# Patient Record
Sex: FEMALE | Race: WHITE | URBAN - METROPOLITAN AREA
[De-identification: names, ages, dates, MRNs, and addresses within clinical notes are randomized per-mention and may not be internally consistent; named-entity substitution may affect disease eponyms.]

---

## 2020-02-11 ENCOUNTER — OFFICE VISIT (OUTPATIENT)
Dept: NEUROPSYCHOLOGY | Facility: CLINIC | Age: 65
End: 2020-02-11
Payer: COMMERCIAL

## 2020-02-11 DIAGNOSIS — R41.3 MEMORY LOSS: Primary | ICD-10-CM

## 2020-02-11 NOTE — LETTER
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  At her annual well physical examination on 10/10/2019, she reported experiencing memory changes.  She cannot come up with words as easily as she used to.  She is doing well at work as a researcher but feels that she should be more efficient at times.  She has a family history of dementia.  She was referred for neuropsychological evaluation by Keshia Dominguez MD, for further characterization of any cognitive difficulties, to establish a neurocognitive baseline, and to evaluate mood.    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  She would be giving an example during a lecture and would lose track of what she was saying.  She feels like she is \"dumbing down\" her language.  She also may see a movie and has to stop and think a few days later to remember it.  She does not know if her cognitive difficulties are progressive or if she is more aware of them. She notices that she forgets details of conversations.  She had two meetings out of state within 5 days, and when she came home she had to write down everything quickly because she worried that she would forget it.  She loses track of what she was in the middle of doing.  She is not as facile with summarizing large numbers of articles.  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  She feels that the quality of her work is good and she keeps getting more contracts.  She " described herself as a concrete sequential thinker, and she was frustrated with herself because she recently wrote an email to a  about something that she did not need to worry about.    Dr. Nice lives with her .  She manages their finances and is the power of  for her father, and does these tasks without difficulty.  She manages her own medications, driving, and cooking, also without difficulty.  She handles her personal cares independently.    Dr. Nice denied any history of psychiatric illness.  She has worked with a psychologist at various times in the past.  She noted that her mother committed suicide when Dr. Nice was 25.  She has never been hospitalized for psychiatric care.  When asked to describe her mood, she stated that it comes and goes.  Her father is dying and her mother  in July, and they have had to move him twice.  Her dog had to be put down, her  had knee surgery, and her daughter recently .  2019 was stressful for her.  She has down days but also has things to look forward to.  She is no more irritable than normal.  She cried a lot this weekend while watching her father.  She falls asleep easily and sleeps 7 hours at night.  She has not been napping during the day.  Her appetite has been lower and she has lost 60 pounds.  Her energy level, interest level, and motivation are good.  She walks her dog daily.  She denied suicidal ideation or any history of attempts to commit suicide.  She denied visual or auditory hallucinations.  She drinks a glass of wine every week or two.  She denied illicit drug use or tobacco use.  She has never been in any chemical dependency treatment programs.    Dr. Nice completed a Ph.D. in [employer] and [employer] research.  She worked at the [employer] until May 2017, and is an emeritus  in the [employer].  She taught classes until the 2017, and now works as a consultant.  She has  been  almost 28 years.  She has 2 children, and 2 stepchildren, as well as 2 grandchildren.    Dr. Nice denied any history of seizure, stroke, or head injury resulting in loss of consciousness.  Her balance has been good.  Yoga has been helpful for this.  She has had numbness in her left foot and noted that she has had 2 foot surgeries.  She has not noticed any tremor or headaches.  She has pain in her left hip while walking, which she believes is because she is accommodating her left foot.    PAST MEDICAL HISTORY: Medical records indicate a history of migraine, hyperlipidemia, hypertension.    CURRENT MEDICATIONS:  Include multiple vitamins-minerals, omega-3 fatty acids, magnesium chloride, calcium citrate, acetaminophen-isometheptene-dichloral, hydrochlorothiazide, estradiol, naproxen, turmeric, valacyclovir, ketoconazole.    FAMILY MEDICAL HISTORY:  Significant for a paternal grandmother who had dementia for the last 3 years of her life, who was also diagnosed around that time with Parkinson's disease, and who  at 98, a father who is 91 and has dementia with memory problems starting about 2 years ago.  Her father and grandmother have cerebrovascular disease.    BEHAVIORAL OBSERVATIONS    During the evaluation, Dr. Nice was pleasant, cooperative, and seemed to understand the instructions.  She was alert and oriented to person, place, and time.  No abnormal movements were observed clinically.  Mood was euthymic.  Speech was fluent, with normal articulation, volume, and rate.  Spontaneous conversation was present and appropriate.  Internal performance validity measures fell within normal limits.  The results are believed to accurately reflect her current level of functioning.    MEASURES ADMINISTERED    The following measures were administered by a trained psychometrist, under the direct supervision of a licensed psychologist:    Wechsler Abbreviated Scale of Intelligence - 2 (WASI-2); Subtests of the  Wechsler Adult Intelligence Scale - 4; Reading subtest of the Wide Range Achievement Test - 4; subtests of the Wechsler Memory Scale - Revised; Juanpablo Auditory Verbal Learning Test; Juanpablo Complex Figure; Jackson Naming Test; Controlled Oral Word Association Test; Semantic Fluency; Clock Drawing; Valdez Judgment of Line Orientation; Trail Making Test; Stroop; Wisconsin Card Sorting Test; Finger Tapping; Grooved Pegboard; Dementia Rating Scale - 2 (DRS-2); Bernardo Depression Inventory - 2 (BDI-2); Bernardo Anxiety Inventory (ELLEN).    RESULTS AND INTERPRETATION    Overall intellectual functioning was estimated to fall in the above average range, consistent with premorbid estimates based on single word reading abilities.  Performance on a screening measure of dementia was high average (DRS-2 Total Score = 142/144).    Confrontation naming was average for her age and level of education.  Expressive vocabulary was above average for her age.  Letter fluency was average for her age and level of education, and generative naming to category was above average.    Attention span was average for her age.  Divided attention was average.  Performance on a measure of distractibility was high average.  Psychomotor processing speed was average for her age.  Finger tapping speed was average bilaterally.  Fine manual dexterity was above average bilaterally.    Basic visual perception, including matching lines and angles, was average for her age and level of education.  Construction of a clock fell within normal limits.  Construction of a complex design also fell within normal limits.  Assembly of visual material was above average.  Visual problem solving was average for her age.    Novel problem-solving, including the ability to generate strategies and solutions, was mildly impaired for her age and level of education.  On this task, she demonstrated a mildly perseverative problem solving style and mild difficulty with  conceptualization.    Immediate recall of verbal narrative material was low average, with above average retention of the learned material following a 30-minute delay.  On a multiple trial list learning task, learning was borderline inefficient, with high average retention (90%) following a 30-minute delay.  Immediate and 30-minute delayed recall of relatively structured visual material was average.  Immediate and 30-minute delayed recall of more complex visual material was average.    On the BDI-2, a self-report questionnaire, Dr. Nice endorsed a minimal level of depressive symptomatology.  She also endorsed a minimal level of anxiety on the ELLEN.    IMPRESSIONS AND RECOMMENDATIONS    Current results indicate mild executive dysfunction, including difficulty with problem-solving, perseverations, and conceptualization.  She has a relative weakness in learning of verbal information, but she retains information quite well when she has learned it.  Language, visual processing, complex attentional processing, and motor functioning all fall well within normal limits.  She denied experiencing significant depressive symptomatology or anxiety.    This pattern of performance does not reflect dementia at this time, and is only subtly abnormal.  There is some suggestion of subtle frontal system involvement, the etiology of which is not entirely clear based on neuropsychological evaluation alone.  Given her risk factors, a cerebrovascular etiology should be ruled out.  She does have a family history of Parkinson's disease, and similar patterns of performance can be seen with PD, but she did not have overt parkinsonism that was observed clinically.  A neurodegenerative etiology seems unlikely.  Of note, the findings would be atypical for Alzheimer's disease as there is no indication a rapid forgetting rate or impaired performance on naming tasks.  If not already considered, she may benefit from referral to neurology for further  evaluation and treatment recommendations.    In terms of daily functioning, Dr. Nice's cognitive inefficiencies are not likely to interfere with her ability to actively participate in treatment or to manage her instrumental activities of daily living independently.  She may find that she has more difficulty managing large, complex tasks, and others may assist by breaking down such tasks into smaller, more manageable parts.  For instance, she has noticed that she is not as facile with summarizing large numbers of articles, and she feels that she is not working as efficiently.  It may be helpful for her to put organizational strategies in place, including writing lists and prioritizing items on the list.  She may benefit from structure and routine.  She may also benefit from the use of written reminders or checklists, and may be able to use a smart phone to assist in this regard, and to help her with organizational tasks.    Results may serve as a baseline of her neurocognitive functioning.  It may be helpful to follow her over time.  Repeated neuropsychological evaluation in one year may help to determine whether her cognitive inefficiencies progress, remit, or remain stable.    Tere Ramirez, Ph.D., [facility]  Licensed Psychologist, [employer] [id number]  Board Certified in Clinical Neuropsychology    Time spent: 65 minutes neurobehavioral status exam including interview, clinical assessment by licensed and board-certified neuropsychologist (CPT 45517). 60 minutes (1 unit) neuropsychological testing evaluation by licensed and board-certified neuropsychologist, including integration of patient data, interpretation of standardized test results and clinical data, clinical decision-making, treatment planning, report, and interactive feedback to the patient, first hour (CPT 50768). 95 minutes (2 units) of neuropsychological testing evaluation by licensed and board-certified neuropsychologist, including integration of patient data,  interpretation of standardized test results and clinical data, clinical decision-making, treatment planning, report, and interactive feedback to the patient, subsequent hours (CPT 66610). 30 minutes of neuropsychological test administration and scoring by technician, first 30 minutes (CPT 34822). 157 additional minutes (5 units) neuropsychological test administration and scoring by technician, subsequent 30 minutes (CPT 67686). ICD-10 Diagnoses: R41.3.

## 2020-02-12 NOTE — NURSING NOTE
Pt was seen for neuropsychological evaluation at the request of Dr. Keshia Dominguez for the purposes of diagnostic clarification and treatment planning. 187 minutes of test administration and scoring were provided by this writer. Please see Dr. Tere Ramirez's report for a full interpretation of the findings.    Fabio Wells  Psychometrist

## 2020-02-23 ENCOUNTER — HEALTH MAINTENANCE LETTER (OUTPATIENT)
Age: 65
End: 2020-02-23

## 2020-03-02 NOTE — PROGRESS NOTES
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  At her annual well physical examination on 10/10/2019, she reported experiencing memory changes.  She cannot come up with words as easily as she used to.  She is doing well at work as a researcher but feels that she should be more efficient at times.  She has a family history of dementia.  She was referred for neuropsychological evaluation by Keshia Dominguez MD, for further characterization of any cognitive difficulties, to establish a neurocognitive baseline, and to evaluate mood.    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  She would be giving an example during a lecture and would lose track of what she was saying.  She feels like she is \"dumbing down\" her language.  She also may see a movie and has to stop and think a few days later to remember it.  She does not know if her cognitive difficulties are progressive or if she is more aware of them. She notices that she forgets details of conversations.  She had two meetings out of state within 5 days, and when she came home she had to write down everything quickly because she worried that she would forget it.  She loses track of what she was in the middle of doing.  She is not as facile with summarizing large numbers of articles.  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  She feels that the quality of her work is good and she keeps getting more contracts.  She described herself as a concrete sequential thinker, and she was frustrated with herself " because she recently wrote an email to a  about something that she did not need to worry about.    Dr. Nice lives with her .  She manages their finances and is the power of  for her father, and does these tasks without difficulty.  She manages her own medications, driving, and cooking, also without difficulty.  She handles her personal cares independently.    Dr. Nice denied any history of psychiatric illness.  She has worked with a psychologist at various times in the past.  She noted that her mother committed suicide when Dr. Nice was 25.  She has never been hospitalized for psychiatric care.  When asked to describe her mood, she stated that it comes and goes.  Her father is dying and her mother  in July, and they have had to move him twice.  Her dog had to be put down, her  had knee surgery, and her daughter recently .  2019 was stressful for her.  She has down days but also has things to look forward to.  She is no more irritable than normal.  She cried a lot this weekend while watching her father.  She falls asleep easily and sleeps 7 hours at night.  She has not been napping during the day.  Her appetite has been lower and she has lost 60 pounds.  Her energy level, interest level, and motivation are good.  She walks her dog daily.  She denied suicidal ideation or any history of attempts to commit suicide.  She denied visual or auditory hallucinations.  She drinks a glass of wine every week or two.  She denied illicit drug use or tobacco use.  She has never been in any chemical dependency treatment programs.    Dr. Nice completed a Ph.D. in [employer] research.  She worked at the [employer] until May 2017, and is an emeritus  in the [employer].  She taught classes until the 2017, and now works as a consultant.  She has been  almost 28 years.  She has 2 children, and 2 stepchildren, as well as 2  grandchildren.    Dr. Nice denied any history of seizure, stroke, or head injury resulting in loss of consciousness.  Her balance has been good.  Yoga has been helpful for this.  She has had numbness in her left foot and noted that she has had 2 foot surgeries.  She has not noticed any tremor or headaches.  She has pain in her left hip while walking, which she believes is because she is accommodating her left foot.    PAST MEDICAL HISTORY: Medical records indicate a history of migraine, hyperlipidemia, hypertension.    CURRENT MEDICATIONS:  Include multiple vitamins-minerals, omega-3 fatty acids, magnesium chloride, calcium citrate, acetaminophen-isometheptene-dichloral, hydrochlorothiazide, estradiol, naproxen, turmeric, valacyclovir, ketoconazole.    FAMILY MEDICAL HISTORY:  Significant for a paternal grandmother who had dementia for the last 3 years of her life, who was also diagnosed around that time with Parkinson's disease, and who  at 98, a father who is 91 and has dementia with memory problems starting about 2 years ago.  Her father and grandmother have cerebrovascular disease.    BEHAVIORAL OBSERVATIONS    During the evaluation, Dr. Nice was pleasant, cooperative, and seemed to understand the instructions.  She was alert and oriented to person, place, and time.  No abnormal movements were observed clinically.  Mood was euthymic.  Speech was fluent, with normal articulation, volume, and rate.  Spontaneous conversation was present and appropriate.  Internal performance validity measures fell within normal limits.  The results are believed to accurately reflect her current level of functioning.    MEASURES ADMINISTERED    The following measures were administered by a trained psychometrist, under the direct supervision of a licensed psychologist:    Wechsler Abbreviated Scale of Intelligence - 2 (WASI-2); Subtests of the Wechsler Adult Intelligence Scale - 4; Reading subtest of the Wide Range  Achievement Test - 4; subtests of the Wechsler Memory Scale - Revised; Juanpablo Auditory Verbal Learning Test; Juanpablo Complex Figure; Recluse Naming Test; Controlled Oral Word Association Test; Semantic Fluency; Clock Drawing; Valdez Judgment of Line Orientation; Trail Making Test; Stroop; Wisconsin Card Sorting Test; Finger Tapping; Grooved Pegboard; Dementia Rating Scale - 2 (DRS-2); Bernardo Depression Inventory - 2 (BDI-2); Bernardo Anxiety Inventory (ELLEN).    RESULTS AND INTERPRETATION    Overall intellectual functioning was estimated to fall in the above average range, consistent with premorbid estimates based on single word reading abilities.  Performance on a screening measure of dementia was high average (DRS-2 Total Score = 142/144).    Confrontation naming was average for her age and level of education.  Expressive vocabulary was above average for her age.  Letter fluency was average for her age and level of education, and generative naming to category was above average.    Attention span was average for her age.  Divided attention was average.  Performance on a measure of distractibility was high average.  Psychomotor processing speed was average for her age.  Finger tapping speed was average bilaterally.  Fine manual dexterity was above average bilaterally.    Basic visual perception, including matching lines and angles, was average for her age and level of education.  Construction of a clock fell within normal limits.  Construction of a complex design also fell within normal limits.  Assembly of visual material was above average.  Visual problem solving was average for her age.    Novel problem-solving, including the ability to generate strategies and solutions, was mildly impaired for her age and level of education.  On this task, she demonstrated a mildly perseverative problem solving style and mild difficulty with conceptualization.    Immediate recall of verbal narrative material was low average, with above average  retention of the learned material following a 30-minute delay.  On a multiple trial list learning task, learning was borderline inefficient, with high average retention (90%) following a 30-minute delay.  Immediate and 30-minute delayed recall of relatively structured visual material was average.  Immediate and 30-minute delayed recall of more complex visual material was average.    On the BDI-2, a self-report questionnaire, Dr. Nice endorsed a minimal level of depressive symptomatology.  She also endorsed a minimal level of anxiety on the ELLEN.    IMPRESSIONS AND RECOMMENDATIONS    Current results indicate mild executive dysfunction, including difficulty with problem-solving, perseverations, and conceptualization.  She has a relative weakness in learning of verbal information, but she retains information quite well when she has learned it.  Language, visual processing, complex attentional processing, and motor functioning all fall well within normal limits.  She denied experiencing significant depressive symptomatology or anxiety.    This pattern of performance does not reflect dementia at this time, and is only subtly abnormal.  There is some suggestion of subtle frontal system involvement, the etiology of which is not entirely clear based on neuropsychological evaluation alone.  Given her risk factors, a cerebrovascular etiology should be ruled out.  She does have a family history of Parkinson's disease, and similar patterns of performance can be seen with PD, but she did not have overt parkinsonism that was observed clinically.  A neurodegenerative etiology seems unlikely.  Of note, the findings would be atypical for Alzheimer's disease as there is no indication a rapid forgetting rate or impaired performance on naming tasks.  If not already considered, she may benefit from referral to neurology for further evaluation and treatment recommendations.    In terms of daily functioning, Dr. Nice's cognitive  inefficiencies are not likely to interfere with her ability to actively participate in treatment or to manage her instrumental activities of daily living independently.  She may find that she has more difficulty managing large, complex tasks, and others may assist by breaking down such tasks into smaller, more manageable parts.  For instance, she has noticed that she is not as facile with summarizing large numbers of articles, and she feels that she is not working as efficiently.  It may be helpful for her to put organizational strategies in place, including writing lists and prioritizing items on the list.  She may benefit from structure and routine.  She may also benefit from the use of written reminders or checklists, and may be able to use a smart phone to assist in this regard, and to help her with organizational tasks.    Results may serve as a baseline of her neurocognitive functioning.  It may be helpful to follow her over time.  Repeated neuropsychological evaluation in one year may help to determine whether her cognitive inefficiencies progress, remit, or remain stable.    Tere Ramirez, Ph.D., ABPP  Licensed Psychologist, [employer] [id number]  Board Certified in Clinical Neuropsychology    Time spent: 65 minutes neurobehavioral status exam including interview, clinical assessment by licensed and board-certified neuropsychologist (CPT 75375). 60 minutes (1 unit) neuropsychological testing evaluation by licensed and board-certified neuropsychologist, including integration of patient data, interpretation of standardized test results and clinical data, clinical decision-making, treatment planning, report, and interactive feedback to the patient, first hour (CPT 57386). 95 minutes (2 units) of neuropsychological testing evaluation by licensed and board-certified neuropsychologist, including integration of patient data, interpretation of standardized test results and clinical data, clinical decision-making, treatment  planning, report, and interactive feedback to the patient, subsequent hours (CPT 50178). 30 minutes of neuropsychological test administration and scoring by technician, first 30 minutes (CPT 82071). 157 additional minutes (5 units) neuropsychological test administration and scoring by technician, subsequent 30 minutes (CPT 70233). ICD-10 Diagnoses: R41.3.

## 2020-03-03 NOTE — PROGRESS NOTES
Name: Ritu Nice MRN: [id number]  : 1955  ESTRADA: 2020  Staff:  Tech:  Age: 64  Sex: Female Hand: Left   Educ: 20  Occupation: Researcher  Vision:  ?with correction / ?without correction  Hearing:  ?with correction / ?without correction      WAIS-IV     Raw SSa     Vocabulary  52 14     Block Design  57 15     Coding  63 11     Digit Span  26 10 RDS= 9     Visual Puzzles  12 9    WRAT4   SS %ile Grade Equiv.     Word Reading  113 81 >12.9    WMS-Revised  Raw    MAS     Info & Orientation 14       LM I   19 7     LM II   22 11     VR I   34 10     VR II   26 10     VR Recognition  2       Juanpablo AVLT   (30 item recognition)     Trial 1 2 3 4 5 B (6) 7              3 5 7 10 10 4 9       Raw MAS     30 minute recall   9 10     30 min Recognition  15 12       Intrusions   1     Learning Efficiency  76      % retention 90          MAS  12    JUANPABLO-COMPLEX FIGURE TEST      Raw    T %ile     Time to Copy  234      >16     Copy    35     >16     Short Delay Recall 18 53 62     Long Delay Recall 16.5 50 50     Recognition Total 21 53 62    BOSTON NAMING TEST   Score: 57 MAS: 9  37%ile                    [ 56   w/o cues        2   w/phonemic cues]     COWAT (CFL)   Score: 43  MAS: 9  37%ile    SEMANTIC FLUENCY   Score: 64  MAS: 14  91%ile    CLOCK DRAWING     Command   3/3             Copy     3/3    TRAIL MAKING TEST    Raw         Err  MAS  %ile   A 36            0               8             25   B 70        0               9             37    STROOP   Raw + Meron  =   Total          MAS %ile    Word 89 +     --  = 89 7 16   Color  66 +     --= 66 8 25       Color/Word  43 +    --= 43 12 75     WCST (128 cards)    Raw   T/%ile   Categories: 2 6-10   % Persev. Err.: 28 35   %Concept. Resp: 30 31   FTMS:  1    FINGER TAPPING    Avg  z   RH  40.33 -0.09    LH 42.67 -0.07      GROOVED PEGBOARD    Raw  Drops T   RH  66  0 57    LH 57  0 61     CHOUDHARY JUDGMENT OF LINE ORIENTATION Form H   Raw: 25  MAS:  11  63%ile:    DEMENTIA RATING SCALE - 2 Standard       Raw       MAS            Raw      MAS  Attention  36  11        Concept   39           12  Init/Psv  37  11  Memory   24       10  Construct 6  10   Total     142/144     13       BDI-II:  6 minimal     ELLEN:  3 minimal     MAS = [place] Older Adult Normative Study Age & Education Adj. Scaled Score

## 2020-12-06 ENCOUNTER — HEALTH MAINTENANCE LETTER (OUTPATIENT)
Age: 65
End: 2020-12-06

## 2021-04-11 ENCOUNTER — HEALTH MAINTENANCE LETTER (OUTPATIENT)
Age: 66
End: 2021-04-11

## 2021-09-25 ENCOUNTER — HEALTH MAINTENANCE LETTER (OUTPATIENT)
Age: 66
End: 2021-09-25

## 2022-05-07 ENCOUNTER — HEALTH MAINTENANCE LETTER (OUTPATIENT)
Age: 67
End: 2022-05-07

## 2023-01-07 ENCOUNTER — HEALTH MAINTENANCE LETTER (OUTPATIENT)
Age: 68
End: 2023-01-07

## 2023-06-02 ENCOUNTER — HEALTH MAINTENANCE LETTER (OUTPATIENT)
Age: 68
End: 2023-06-02